# Patient Record
Sex: FEMALE | Race: OTHER
[De-identification: names, ages, dates, MRNs, and addresses within clinical notes are randomized per-mention and may not be internally consistent; named-entity substitution may affect disease eponyms.]

---

## 2017-12-20 PROBLEM — Z00.00 ENCOUNTER FOR PREVENTIVE HEALTH EXAMINATION: Status: ACTIVE | Noted: 2017-12-20

## 2017-12-21 ENCOUNTER — APPOINTMENT (OUTPATIENT)
Dept: CT IMAGING | Facility: CLINIC | Age: 47
End: 2017-12-21
Payer: COMMERCIAL

## 2017-12-21 ENCOUNTER — OUTPATIENT (OUTPATIENT)
Dept: OUTPATIENT SERVICES | Facility: HOSPITAL | Age: 47
LOS: 1 days | End: 2017-12-21
Payer: COMMERCIAL

## 2017-12-21 DIAGNOSIS — Z00.8 ENCOUNTER FOR OTHER GENERAL EXAMINATION: ICD-10-CM

## 2017-12-21 PROCEDURE — 74178 CT ABD&PLV WO CNTR FLWD CNTR: CPT | Mod: 26

## 2017-12-21 PROCEDURE — 74178 CT ABD&PLV WO CNTR FLWD CNTR: CPT

## 2018-01-02 ENCOUNTER — OUTPATIENT (OUTPATIENT)
Dept: OUTPATIENT SERVICES | Facility: HOSPITAL | Age: 48
LOS: 1 days | End: 2018-01-02
Payer: COMMERCIAL

## 2018-01-02 VITALS
WEIGHT: 156.53 LBS | HEIGHT: 58 IN | SYSTOLIC BLOOD PRESSURE: 134 MMHG | RESPIRATION RATE: 16 BRPM | TEMPERATURE: 97 F | HEART RATE: 84 BPM | DIASTOLIC BLOOD PRESSURE: 87 MMHG

## 2018-01-02 DIAGNOSIS — Z98.51 TUBAL LIGATION STATUS: Chronic | ICD-10-CM

## 2018-01-02 DIAGNOSIS — N93.8 OTHER SPECIFIED ABNORMAL UTERINE AND VAGINAL BLEEDING: ICD-10-CM

## 2018-01-02 DIAGNOSIS — Z01.818 ENCOUNTER FOR OTHER PREPROCEDURAL EXAMINATION: ICD-10-CM

## 2018-01-02 DIAGNOSIS — Z86.718 PERSONAL HISTORY OF OTHER VENOUS THROMBOSIS AND EMBOLISM: Chronic | ICD-10-CM

## 2018-01-02 DIAGNOSIS — Z98.891 HISTORY OF UTERINE SCAR FROM PREVIOUS SURGERY: Chronic | ICD-10-CM

## 2018-01-02 LAB
ANION GAP SERPL CALC-SCNC: 14 MMOL/L — SIGNIFICANT CHANGE UP (ref 5–17)
ANISOCYTOSIS BLD QL: SLIGHT — SIGNIFICANT CHANGE UP
APTT BLD: 32 SEC — SIGNIFICANT CHANGE UP (ref 27.5–37.4)
BASOPHILS # BLD AUTO: 0 K/UL — SIGNIFICANT CHANGE UP (ref 0–0.2)
BASOPHILS NFR BLD AUTO: 0.3 % — SIGNIFICANT CHANGE UP (ref 0–2)
BLD GP AB SCN SERPL QL: SIGNIFICANT CHANGE UP
BUN SERPL-MCNC: 11 MG/DL — SIGNIFICANT CHANGE UP (ref 8–20)
CALCIUM SERPL-MCNC: 9.4 MG/DL — SIGNIFICANT CHANGE UP (ref 8.6–10.2)
CHLORIDE SERPL-SCNC: 103 MMOL/L — SIGNIFICANT CHANGE UP (ref 98–107)
CO2 SERPL-SCNC: 22 MMOL/L — SIGNIFICANT CHANGE UP (ref 22–29)
CREAT SERPL-MCNC: 0.52 MG/DL — SIGNIFICANT CHANGE UP (ref 0.5–1.3)
EOSINOPHIL # BLD AUTO: 0.2 K/UL — SIGNIFICANT CHANGE UP (ref 0–0.5)
EOSINOPHIL NFR BLD AUTO: 2.3 % — SIGNIFICANT CHANGE UP (ref 0–6)
GIANT PLATELETS BLD QL SMEAR: PRESENT — SIGNIFICANT CHANGE UP
GLUCOSE SERPL-MCNC: 106 MG/DL — SIGNIFICANT CHANGE UP (ref 70–115)
HCG SERPL-ACNC: <5 MIU/ML — SIGNIFICANT CHANGE UP
HCT VFR BLD CALC: 43.9 % — SIGNIFICANT CHANGE UP (ref 37–47)
HGB BLD-MCNC: 14.3 G/DL — SIGNIFICANT CHANGE UP (ref 12–16)
HYPERCHROMIA BLD QL AUTO: SLIGHT — SIGNIFICANT CHANGE UP
INR BLD: 1.04 RATIO — SIGNIFICANT CHANGE UP (ref 0.88–1.16)
LG PLATELETS BLD QL AUTO: SIGNIFICANT CHANGE UP
LYMPHOCYTES # BLD AUTO: 2.6 K/UL — SIGNIFICANT CHANGE UP (ref 1–4.8)
LYMPHOCYTES # BLD AUTO: 29.7 % — SIGNIFICANT CHANGE UP (ref 20–55)
MCHC RBC-ENTMCNC: 26.7 PG — LOW (ref 27–31)
MCHC RBC-ENTMCNC: 32.6 G/DL — SIGNIFICANT CHANGE UP (ref 32–36)
MCV RBC AUTO: 81.9 FL — SIGNIFICANT CHANGE UP (ref 81–99)
MICROCYTES BLD QL: SLIGHT — SIGNIFICANT CHANGE UP
MONOCYTES # BLD AUTO: 0.8 K/UL — SIGNIFICANT CHANGE UP (ref 0–0.8)
MONOCYTES NFR BLD AUTO: 9 % — SIGNIFICANT CHANGE UP (ref 3–10)
NEUTROPHILS # BLD AUTO: 5 K/UL — SIGNIFICANT CHANGE UP (ref 1.8–8)
NEUTROPHILS NFR BLD AUTO: 58.2 % — SIGNIFICANT CHANGE UP (ref 37–73)
PLAT MORPH BLD: ABNORMAL
PLATELET # BLD AUTO: 258 K/UL — SIGNIFICANT CHANGE UP (ref 150–400)
POIKILOCYTOSIS BLD QL AUTO: SLIGHT — SIGNIFICANT CHANGE UP
POTASSIUM SERPL-MCNC: 3.8 MMOL/L — SIGNIFICANT CHANGE UP (ref 3.5–5.3)
POTASSIUM SERPL-SCNC: 3.8 MMOL/L — SIGNIFICANT CHANGE UP (ref 3.5–5.3)
PROTHROM AB SERPL-ACNC: 11.5 SEC — SIGNIFICANT CHANGE UP (ref 9.8–12.7)
RBC # BLD: 5.36 M/UL — HIGH (ref 4.4–5.2)
RBC # FLD: 22.4 % — HIGH (ref 11–15.6)
RBC BLD AUTO: ABNORMAL
SODIUM SERPL-SCNC: 139 MMOL/L — SIGNIFICANT CHANGE UP (ref 135–145)
TYPE + AB SCN PNL BLD: SIGNIFICANT CHANGE UP
WBC # BLD: 8.6 K/UL — SIGNIFICANT CHANGE UP (ref 4.8–10.8)
WBC # FLD AUTO: 8.6 K/UL — SIGNIFICANT CHANGE UP (ref 4.8–10.8)

## 2018-01-02 PROCEDURE — 36415 COLL VENOUS BLD VENIPUNCTURE: CPT

## 2018-01-02 PROCEDURE — 86850 RBC ANTIBODY SCREEN: CPT

## 2018-01-02 PROCEDURE — 86901 BLOOD TYPING SEROLOGIC RH(D): CPT

## 2018-01-02 PROCEDURE — 85610 PROTHROMBIN TIME: CPT

## 2018-01-02 PROCEDURE — 85730 THROMBOPLASTIN TIME PARTIAL: CPT

## 2018-01-02 PROCEDURE — 85027 COMPLETE CBC AUTOMATED: CPT

## 2018-01-02 PROCEDURE — 80048 BASIC METABOLIC PNL TOTAL CA: CPT

## 2018-01-02 PROCEDURE — G0463: CPT

## 2018-01-02 PROCEDURE — 86900 BLOOD TYPING SEROLOGIC ABO: CPT

## 2018-01-02 PROCEDURE — 84702 CHORIONIC GONADOTROPIN TEST: CPT

## 2018-01-02 RX ORDER — SODIUM CHLORIDE 9 MG/ML
3 INJECTION INTRAMUSCULAR; INTRAVENOUS; SUBCUTANEOUS ONCE
Qty: 0 | Refills: 0 | Status: DISCONTINUED | OUTPATIENT
Start: 2018-01-16 | End: 2018-01-16

## 2018-01-02 NOTE — H&P PST ADULT - PMH
Anemia    Cerebral aneurysm    Renal calculi Anemia    Cerebral embolus  As per pt had "blood clot in brain"  Renal calculi

## 2018-01-02 NOTE — H&P PST ADULT - PROBLEM SELECTOR PLAN 1
Robotic Assisted Total Laparoscopic Hysterectomy, Bilateral Salpingectomy, Cystoscopy, Excision of Vaginal Septum  Spoke with Monica at Dr. Polanco's office regarding abnormal Stress ECHO.  They will contact her PMD and determine what clearance is needed. Robotic Assisted Total Laparoscopic Hysterectomy, Bilateral Salpingectomy, Cystoscopy, Excision of Vaginal Septum  Spoke with Monica at Dr. Polanco's office regarding abnormal Stress ECHO.  They will contact her PMD and determine what clearance is needed.  Pt will require Cardiac Clearance.  I made an appointment for her on January 5, 2018 at 10:40 a.m.

## 2018-01-02 NOTE — ASU PATIENT PROFILE, ADULT - PSH
H/O tubal ligation    History of blood clot in brain  Done in  in [place]  and 2016 in [place]  S/P   x 2

## 2018-01-02 NOTE — H&P PST ADULT - HISTORY OF PRESENT ILLNESS
This is a 47 y.o Bulgarian speaking female who presents to PST today.  The pt reports she has been experiencing abnormal vaginal bleeding for the past 3-4 years, but has become worse more recently and is now accompanied by pelvic pain.   She is scheduled for a hysterectomy in the near future.

## 2018-01-02 NOTE — H&P PST ADULT - NSANTHOSAYNRD_GEN_A_CORE
No. CHARLI screening performed.  STOP BANG Legend: 0-2 = LOW Risk; 3-4 = INTERMEDIATE Risk; 5-8 = HIGH Risk

## 2018-01-02 NOTE — H&P PST ADULT - LAST STRESS TEST
November 2017 (states to have further testing, but insurance not covering it)  Pt does not recall what the abnormality was

## 2018-01-02 NOTE — H&P PST ADULT - PSH
H/O tubal ligation    History of blood clot in brain  Done in  in [place]  and 2016 in [place]  S/P   x 2

## 2018-01-02 NOTE — H&P PST ADULT - FAMILY HISTORY
Father  Still living? Yes, Estimated age: 81-90  Family history of diabetes mellitus, Age at diagnosis: Age Unknown

## 2018-01-16 ENCOUNTER — RESULT REVIEW (OUTPATIENT)
Age: 48
End: 2018-01-16

## 2018-01-16 ENCOUNTER — INPATIENT (INPATIENT)
Facility: HOSPITAL | Age: 48
LOS: 0 days | Discharge: HOME | DRG: 743 | End: 2018-01-17
Attending: OBSTETRICS & GYNECOLOGY | Admitting: OBSTETRICS & GYNECOLOGY
Payer: COMMERCIAL

## 2018-01-16 VITALS — WEIGHT: 156.97 LBS | HEIGHT: 58 IN

## 2018-01-16 DIAGNOSIS — Z86.718 PERSONAL HISTORY OF OTHER VENOUS THROMBOSIS AND EMBOLISM: Chronic | ICD-10-CM

## 2018-01-16 DIAGNOSIS — Z98.51 TUBAL LIGATION STATUS: Chronic | ICD-10-CM

## 2018-01-16 DIAGNOSIS — Z98.891 HISTORY OF UTERINE SCAR FROM PREVIOUS SURGERY: Chronic | ICD-10-CM

## 2018-01-16 DIAGNOSIS — N93.8 OTHER SPECIFIED ABNORMAL UTERINE AND VAGINAL BLEEDING: ICD-10-CM

## 2018-01-16 DIAGNOSIS — Z01.818 ENCOUNTER FOR OTHER PREPROCEDURAL EXAMINATION: ICD-10-CM

## 2018-01-16 LAB
GLUCOSE BLDC GLUCOMTR-MCNC: 100 MG/DL — HIGH (ref 70–99)
GLUCOSE BLDC GLUCOMTR-MCNC: 88 MG/DL — SIGNIFICANT CHANGE UP (ref 70–99)
GLUCOSE BLDC GLUCOMTR-MCNC: 99 MG/DL — SIGNIFICANT CHANGE UP (ref 70–99)
HCT VFR BLD CALC: 33.1 % — LOW (ref 37–47)
HGB BLD-MCNC: 11.2 G/DL — LOW (ref 12–16)
MCHC RBC-ENTMCNC: 28 PG — SIGNIFICANT CHANGE UP (ref 27–31)
MCHC RBC-ENTMCNC: 33.8 G/DL — SIGNIFICANT CHANGE UP (ref 32–36)
MCV RBC AUTO: 82.8 FL — SIGNIFICANT CHANGE UP (ref 81–99)
PLATELET # BLD AUTO: 201 K/UL — SIGNIFICANT CHANGE UP (ref 150–400)
RBC # BLD: 4 M/UL — LOW (ref 4.4–5.2)
RBC # FLD: 17.4 % — HIGH (ref 11–15.6)
WBC # BLD: 16.9 K/UL — HIGH (ref 4.8–10.8)
WBC # FLD AUTO: 16.9 K/UL — HIGH (ref 4.8–10.8)

## 2018-01-16 PROCEDURE — 88307 TISSUE EXAM BY PATHOLOGIST: CPT | Mod: 26

## 2018-01-16 PROCEDURE — 88305 TISSUE EXAM BY PATHOLOGIST: CPT | Mod: 26

## 2018-01-16 PROCEDURE — 88302 TISSUE EXAM BY PATHOLOGIST: CPT | Mod: 26

## 2018-01-16 RX ORDER — CEFOTETAN DISODIUM 1 G
1 VIAL (EA) INJECTION ONCE
Qty: 0 | Refills: 0 | Status: COMPLETED | OUTPATIENT
Start: 2018-01-16 | End: 2018-01-16

## 2018-01-16 RX ORDER — ASPIRIN/CALCIUM CARB/MAGNESIUM 324 MG
1 TABLET ORAL
Qty: 0 | Refills: 0 | COMMUNITY

## 2018-01-16 RX ORDER — OXYCODONE AND ACETAMINOPHEN 5; 325 MG/1; MG/1
2 TABLET ORAL EVERY 4 HOURS
Qty: 0 | Refills: 0 | Status: DISCONTINUED | OUTPATIENT
Start: 2018-01-16 | End: 2018-01-17

## 2018-01-16 RX ORDER — ENOXAPARIN SODIUM 100 MG/ML
40 INJECTION SUBCUTANEOUS DAILY
Qty: 0 | Refills: 0 | Status: DISCONTINUED | OUTPATIENT
Start: 2018-01-17 | End: 2018-01-17

## 2018-01-16 RX ORDER — ENOXAPARIN SODIUM 100 MG/ML
40 INJECTION SUBCUTANEOUS ONCE
Qty: 0 | Refills: 0 | Status: COMPLETED | OUTPATIENT
Start: 2018-01-16 | End: 2018-01-16

## 2018-01-16 RX ORDER — ACETAMINOPHEN 500 MG
1000 TABLET ORAL ONCE
Qty: 0 | Refills: 0 | Status: COMPLETED | OUTPATIENT
Start: 2018-01-16 | End: 2018-01-16

## 2018-01-16 RX ORDER — KETOROLAC TROMETHAMINE 30 MG/ML
30 SYRINGE (ML) INJECTION EVERY 6 HOURS
Qty: 0 | Refills: 0 | Status: DISCONTINUED | OUTPATIENT
Start: 2018-01-16 | End: 2018-01-17

## 2018-01-16 RX ORDER — SODIUM CHLORIDE 9 MG/ML
1000 INJECTION, SOLUTION INTRAVENOUS
Qty: 0 | Refills: 0 | Status: DISCONTINUED | OUTPATIENT
Start: 2018-01-16 | End: 2018-01-16

## 2018-01-16 RX ORDER — ONDANSETRON 8 MG/1
4 TABLET, FILM COATED ORAL EVERY 6 HOURS
Qty: 0 | Refills: 0 | Status: DISCONTINUED | OUTPATIENT
Start: 2018-01-16 | End: 2018-01-17

## 2018-01-16 RX ORDER — DEXTROSE MONOHYDRATE, SODIUM CHLORIDE, AND POTASSIUM CHLORIDE 50; .745; 4.5 G/1000ML; G/1000ML; G/1000ML
1000 INJECTION, SOLUTION INTRAVENOUS
Qty: 0 | Refills: 0 | Status: DISCONTINUED | OUTPATIENT
Start: 2018-01-16 | End: 2018-01-17

## 2018-01-16 RX ORDER — HYDROMORPHONE HYDROCHLORIDE 2 MG/ML
0.5 INJECTION INTRAMUSCULAR; INTRAVENOUS; SUBCUTANEOUS
Qty: 0 | Refills: 0 | Status: DISCONTINUED | OUTPATIENT
Start: 2018-01-16 | End: 2018-01-16

## 2018-01-16 RX ORDER — ONDANSETRON 8 MG/1
4 TABLET, FILM COATED ORAL ONCE
Qty: 0 | Refills: 0 | Status: DISCONTINUED | OUTPATIENT
Start: 2018-01-16 | End: 2018-01-16

## 2018-01-16 RX ORDER — OXYCODONE AND ACETAMINOPHEN 5; 325 MG/1; MG/1
1 TABLET ORAL EVERY 4 HOURS
Qty: 0 | Refills: 0 | Status: DISCONTINUED | OUTPATIENT
Start: 2018-01-16 | End: 2018-01-17

## 2018-01-16 RX ORDER — CEFOTETAN DISODIUM 1 G
2 VIAL (EA) INJECTION ONCE
Qty: 0 | Refills: 0 | Status: COMPLETED | OUTPATIENT
Start: 2018-01-16 | End: 2018-01-16

## 2018-01-16 RX ADMIN — Medication 204 MILLIGRAM(S): at 21:02

## 2018-01-16 RX ADMIN — Medication 100 GRAM(S): at 12:52

## 2018-01-16 RX ADMIN — Medication 1000 MILLIGRAM(S): at 21:15

## 2018-01-16 RX ADMIN — HYDROMORPHONE HYDROCHLORIDE 0.5 MILLIGRAM(S): 2 INJECTION INTRAMUSCULAR; INTRAVENOUS; SUBCUTANEOUS at 16:37

## 2018-01-16 RX ADMIN — HYDROMORPHONE HYDROCHLORIDE 0.5 MILLIGRAM(S): 2 INJECTION INTRAMUSCULAR; INTRAVENOUS; SUBCUTANEOUS at 14:30

## 2018-01-16 RX ADMIN — Medication 100 GRAM(S): at 19:03

## 2018-01-16 RX ADMIN — ENOXAPARIN SODIUM 40 MILLIGRAM(S): 100 INJECTION SUBCUTANEOUS at 19:03

## 2018-01-16 RX ADMIN — ONDANSETRON 4 MILLIGRAM(S): 8 TABLET, FILM COATED ORAL at 19:04

## 2018-01-16 RX ADMIN — HYDROMORPHONE HYDROCHLORIDE 0.5 MILLIGRAM(S): 2 INJECTION INTRAMUSCULAR; INTRAVENOUS; SUBCUTANEOUS at 14:15

## 2018-01-16 RX ADMIN — HYDROMORPHONE HYDROCHLORIDE 0.5 MILLIGRAM(S): 2 INJECTION INTRAMUSCULAR; INTRAVENOUS; SUBCUTANEOUS at 12:45

## 2018-01-16 RX ADMIN — HYDROMORPHONE HYDROCHLORIDE 0.5 MILLIGRAM(S): 2 INJECTION INTRAMUSCULAR; INTRAVENOUS; SUBCUTANEOUS at 12:30

## 2018-01-16 RX ADMIN — Medication 400 MILLIGRAM(S): at 20:40

## 2018-01-16 RX ADMIN — Medication 30 MILLIGRAM(S): at 16:37

## 2018-01-16 RX ADMIN — DEXTROSE MONOHYDRATE, SODIUM CHLORIDE, AND POTASSIUM CHLORIDE 125 MILLILITER(S): 50; .745; 4.5 INJECTION, SOLUTION INTRAVENOUS at 20:15

## 2018-01-16 RX ADMIN — HYDROMORPHONE HYDROCHLORIDE 0.5 MILLIGRAM(S): 2 INJECTION INTRAMUSCULAR; INTRAVENOUS; SUBCUTANEOUS at 15:31

## 2018-01-16 NOTE — ASU DISCHARGE PLAN (ADULT/PEDIATRIC). - ACTIVITY LEVEL
no heavy lifting/weight bearing as tolerated/nothing per vagina/do not drive for 1 week or while taking pain medication

## 2018-01-16 NOTE — PROGRESS NOTE ADULT - PROBLEM SELECTOR PLAN 1
DC to home pending void >150cc's and lovenox injection  Dc instructions given at bedside with , all questions answered  Percocet and naproxen scripts for pain control  Follow-up in one week  Plan per Dr. Polanco

## 2018-01-16 NOTE — CHART NOTE - NSCHARTNOTEFT_GEN_A_CORE
Pt is s/p  RA [facility] BS, cystoscopy, excision of vaginal septum POD 0  I was called by the nurse and notified that the patient had an episode of vomiting and was feeling nauseous. Zofran was given 1 hr prior to the phone call. Pt was seen and examined at bedside. She says that her pain is well controlled, however feels very nauseous at this time. She says she has gotten anesthesia before and has felt this way with prior anesthesia. Physical exam was benign. I ordered ofirmev and 25mg phenergan, before the phenergan was given, she had another episode of vomiting. Dr. Polanco was made aware, he agreed with plan and instructed to get a stat CBC and 1 mg ativan. I instructed the nurse to hold ativan for now, as the patient had just received the phenergan and I wanted to space out if possible. As per Dr. Polanco, no need to make NPO, however I will reaccess and monitor.  Will f/u with stat CBC .

## 2018-01-16 NOTE — ASU DISCHARGE PLAN (ADULT/PEDIATRIC). - MEDICATION SUMMARY - MEDICATIONS TO TAKE
I will START or STAY ON the medications listed below when I get home from the hospital:    oxyCODONE-acetaminophen 5 mg-325 mg oral tablet  -- 1 tab(s) by mouth every 6 hours, As Needed -for severe pain MDD:4 tabs  -- Caution federal law prohibits the transfer of this drug to any person other  than the person for whom it was prescribed.  May cause drowsiness.  Alcohol may intensify this effect.  Use care when operating dangerous machinery.  This prescription cannot be refilled.  This product contains acetaminophen.  Do not use  with any other product containing acetaminophen to prevent possible liver damage.  Using more of this medication than prescribed may cause serious breathing problems.    -- Indication: For pain     Naprosyn 500 mg oral tablet  -- 1 tab(s) by mouth every 12 hours x 3 days   -- Check with your doctor before becoming pregnant.  May cause drowsiness or dizziness.  Obtain medical advice before taking any non-prescription drugs as some may affect the action of this medication.  Take with food or milk.    -- Indication: For pain     aspirin 81 mg oral tablet  -- 1 tab(s) by mouth once a day  -- Indication: For home medication

## 2018-01-16 NOTE — ASU DISCHARGE PLAN (ADULT/PEDIATRIC). - FOLLOWUP APPOINTMENT CLINIC/PHYSICIAN
Please make an appointment to see the PA in Dr. Polanco's office in 1 week.   Please make an appointment to see Dr. Polanco in his office in 2 weeks.

## 2018-01-16 NOTE — PROGRESS NOTE ADULT - SUBJECTIVE AND OBJECTIVE BOX
GYNECOLOGIC ONCOLOGY PROGRESS NOTE    POD#0    PROBLEMS:DUB    Pt seen and examined at bedside.     SUBJECTIVE:    Patient is without complaints.  Pain well-controlled.  Denies Nausea, Vomiting or Diarrhea.  Denies shortness of breath, chest pain or dyspnea on exertion.  Tolerating diet.    OBJECTIVE:     VITALS:  T(F): 98.1 (01-16-18 @ 11:40), Max: 98.1 (01-16-18 @ 11:40)  HR: 79 (01-16-18 @ 13:30) (60 - 79)  BP: 113/70 (01-16-18 @ 13:30) (105/59 - 123/69)  RR: 14 (01-16-18 @ 13:30) (14 - 21)  SpO2: 98% (01-16-18 @ 13:30) (98% - 100%)      I&O's Summary  s/p removal of vera intra-op, will follow-up void.    MEDICATIONS  (STANDING):  lactated ringers. 1000 milliLiter(s) (100 mL/Hr) IV Continuous <Continuous>    MEDICATIONS  (PRN):  HYDROmorphone  Injectable 0.5 milliGRAM(s) IV Push every 10 minutes PRN Moderate Pain (4 - 6)  ondansetron Injectable 4 milliGRAM(s) IV Push once PRN Nausea and/or Vomiting      Physical Exam:  Constitutional: NAD  Pulmonary: clear to auscultation bilaterally   Cardiovascular: Regular rate and rhythm   Abdomen: soft, non-tender, non-distended, normal bowel sounds  Extremities: no lower extremity edema or calve tenderness, Lai's sign negative.  Incision: Clean, dry, op-sites intact.  Without signs of infection or hernia.

## 2018-01-17 ENCOUNTER — TRANSCRIPTION ENCOUNTER (OUTPATIENT)
Age: 48
End: 2018-01-17

## 2018-01-17 VITALS
DIASTOLIC BLOOD PRESSURE: 67 MMHG | SYSTOLIC BLOOD PRESSURE: 108 MMHG | HEART RATE: 71 BPM | RESPIRATION RATE: 16 BRPM | OXYGEN SATURATION: 98 % | TEMPERATURE: 98 F

## 2018-01-17 LAB
ANION GAP SERPL CALC-SCNC: 12 MMOL/L — SIGNIFICANT CHANGE UP (ref 5–17)
BUN SERPL-MCNC: 5 MG/DL — LOW (ref 8–20)
CALCIUM SERPL-MCNC: 8.7 MG/DL — SIGNIFICANT CHANGE UP (ref 8.6–10.2)
CHLORIDE SERPL-SCNC: 103 MMOL/L — SIGNIFICANT CHANGE UP (ref 98–107)
CO2 SERPL-SCNC: 22 MMOL/L — SIGNIFICANT CHANGE UP (ref 22–29)
CREAT SERPL-MCNC: 0.45 MG/DL — LOW (ref 0.5–1.3)
EOSINOPHIL # BLD AUTO: 0 K/UL — SIGNIFICANT CHANGE UP (ref 0–0.5)
EOSINOPHIL NFR BLD AUTO: 0.1 % — SIGNIFICANT CHANGE UP (ref 0–6)
GLUCOSE SERPL-MCNC: 125 MG/DL — HIGH (ref 70–115)
HCT VFR BLD CALC: 31.9 % — LOW (ref 37–47)
HGB BLD-MCNC: 10.4 G/DL — LOW (ref 12–16)
LYMPHOCYTES # BLD AUTO: 1.5 K/UL — SIGNIFICANT CHANGE UP (ref 1–4.8)
LYMPHOCYTES # BLD AUTO: 11.6 % — LOW (ref 20–55)
MAGNESIUM SERPL-MCNC: 2.2 MG/DL — SIGNIFICANT CHANGE UP (ref 1.6–2.6)
MCHC RBC-ENTMCNC: 27.3 PG — SIGNIFICANT CHANGE UP (ref 27–31)
MCHC RBC-ENTMCNC: 32.6 G/DL — SIGNIFICANT CHANGE UP (ref 32–36)
MCV RBC AUTO: 83.7 FL — SIGNIFICANT CHANGE UP (ref 81–99)
MONOCYTES # BLD AUTO: 1.2 K/UL — HIGH (ref 0–0.8)
MONOCYTES NFR BLD AUTO: 9.1 % — SIGNIFICANT CHANGE UP (ref 3–10)
NEUTROPHILS # BLD AUTO: 10.2 K/UL — HIGH (ref 1.8–8)
NEUTROPHILS NFR BLD AUTO: 78.9 % — HIGH (ref 37–73)
PHOSPHATE SERPL-MCNC: 2.7 MG/DL — SIGNIFICANT CHANGE UP (ref 2.4–4.7)
PLATELET # BLD AUTO: 248 K/UL — SIGNIFICANT CHANGE UP (ref 150–400)
POTASSIUM SERPL-MCNC: 3.8 MMOL/L — SIGNIFICANT CHANGE UP (ref 3.5–5.3)
POTASSIUM SERPL-SCNC: 3.8 MMOL/L — SIGNIFICANT CHANGE UP (ref 3.5–5.3)
RBC # BLD: 3.81 M/UL — LOW (ref 4.4–5.2)
RBC # FLD: 17.8 % — HIGH (ref 11–15.6)
SODIUM SERPL-SCNC: 137 MMOL/L — SIGNIFICANT CHANGE UP (ref 135–145)
WBC # BLD: 13 K/UL — HIGH (ref 4.8–10.8)
WBC # FLD AUTO: 13 K/UL — HIGH (ref 4.8–10.8)

## 2018-01-17 NOTE — PROGRESS NOTE ADULT - SUBJECTIVE AND OBJECTIVE BOX
GYNECOLOGIC ONCOLOGY PROGRESS NOTE    POD#1    PROBLEMS:  Dysfunctional uterine bleeding      Pt seen and examined at bedside. Patient with 2 episodes of vomiting overnight, has since resolved. Patient admits to mild nausea.     SUBJECTIVE:    Pain well-controlled.  Flatus: No  Denies Vomiting or Diarrhea.  Denies shortness of breath, chest pain or dyspnea on exertion.  Tolerating liquids, will eat breakfast.    OBJECTIVE:     VITALS:  T(F): 98.9 (01-17-18 @ 05:17), Max: 98.9 (01-17-18 @ 05:17)  HR: 84 (01-17-18 @ 05:17) (60 - 100)  BP: 98/64 (01-17-18 @ 05:17) (98/64 - 123/69)  RR: 18 (01-17-18 @ 05:17) (14 - 21)  SpO2: 95% (01-17-18 @ 05:17) (93% - 100%)    MEDICATIONS  (STANDING):  dextrose 5% + sodium chloride 0.45% with potassium chloride 20 mEq/L 1000 milliLiter(s) (125 mL/Hr) IV Continuous <Continuous>  enoxaparin Injectable 40 milliGRAM(s) SubCutaneous daily  LORazepam   Injectable 1 milliGRAM(s) IV Push once    MEDICATIONS  (PRN):  ketorolac   Injectable 30 milliGRAM(s) IV Push every 6 hours PRN Moderate Pain  ondansetron Injectable 4 milliGRAM(s) IV Push every 6 hours PRN Postoperative Nausea and/or Vomiting  oxyCODONE    5 mG/acetaminophen 325 mG 1 Tablet(s) Oral every 4 hours PRN Moderate Pain  oxyCODONE    5 mG/acetaminophen 325 mG 2 Tablet(s) Oral every 4 hours PRN Severe Pain      Physical Exam:  Constitutional: NAD  Pulmonary: clear to auscultation bilaterally   Cardiovascular: Regular rate and rhythm   Abdomen: soft, non-tender, non-distended, normal bowel sounds  Extremities: no lower extremity edema or calve tenderness, Lai's sign negative.  Incision: Clean, dry, intact.  Without signs of infection or hernia.      LABS:                        10.4   13.0  )-----------( 248      ( 17 Jan 2018 06:12 )             31.9     01-17    137  |  103  |  5.0<L>  ----------------------------<  125<H>  3.8   |  22.0  |  0.45<L>    Ca    8.7      17 Jan 2018 06:12  Phos  2.7     01-17  Mg     2.2     01-17        Patient seen and examined with Dr. Camejo. GYNECOLOGIC ONCOLOGY PROGRESS NOTE    POD#1    PROBLEMS:  Dysfunctional uterine bleeding      Pt seen and examined at bedside. Patient with 2 episodes of vomiting overnight, has since resolved. Patient admits to mild nausea, though tolerating clears.    SUBJECTIVE:    Pain well-controlled.  Flatus: No  Denies Vomiting or Diarrhea.  Denies shortness of breath, chest pain or dyspnea on exertion.  Tolerating liquids, will eat breakfast.    OBJECTIVE:     VITALS:  T(F): 98.9 (01-17-18 @ 05:17), Max: 98.9 (01-17-18 @ 05:17)  HR: 84 (01-17-18 @ 05:17) (60 - 100)  BP: 98/64 (01-17-18 @ 05:17) (98/64 - 123/69)  RR: 18 (01-17-18 @ 05:17) (14 - 21)  SpO2: 95% (01-17-18 @ 05:17) (93% - 100%)    MEDICATIONS  (STANDING):  dextrose 5% + sodium chloride 0.45% with potassium chloride 20 mEq/L 1000 milliLiter(s) (125 mL/Hr) IV Continuous <Continuous>  enoxaparin Injectable 40 milliGRAM(s) SubCutaneous daily  LORazepam   Injectable 1 milliGRAM(s) IV Push once    MEDICATIONS  (PRN):  ketorolac   Injectable 30 milliGRAM(s) IV Push every 6 hours PRN Moderate Pain  ondansetron Injectable 4 milliGRAM(s) IV Push every 6 hours PRN Postoperative Nausea and/or Vomiting  oxyCODONE    5 mG/acetaminophen 325 mG 1 Tablet(s) Oral every 4 hours PRN Moderate Pain  oxyCODONE    5 mG/acetaminophen 325 mG 2 Tablet(s) Oral every 4 hours PRN Severe Pain      Physical Exam:  Constitutional: NAD  Pulmonary: clear to auscultation bilaterally   Cardiovascular: Regular rate and rhythm   Abdomen: soft, non-tender, non-distended, normal bowel sounds  Extremities: no lower extremity edema or calve tenderness, Lai's sign negative.  Incision: Clean, dry, intact.  Without signs of infection or hernia.      LABS:                        10.4   13.0  )-----------( 248      ( 17 Jan 2018 06:12 )             31.9     01-17    137  |  103  |  5.0<L>  ----------------------------<  125<H>  3.8   |  22.0  |  0.45<L>    Ca    8.7      17 Jan 2018 06:12  Phos  2.7     01-17  Mg     2.2     01-17        Patient seen and examined with Dr. Camejo.

## 2018-01-17 NOTE — DISCHARGE NOTE ADULT - CARE PROVIDER_API CALL
Kofi Polanco), Gynecologic Oncology; Obstetrics and Gynecology  [address] [place] [address]  Phone: [phone]  Fax: [phone]

## 2018-01-17 NOTE — DISCHARGE NOTE ADULT - PLAN OF CARE
Improve Care Please follow-up with PA in one week for post-op visit/removal of sutures.  Follow-up with Dr. Polanco in two weeks to review pathology report.

## 2018-01-17 NOTE — DISCHARGE NOTE ADULT - ADDITIONAL INSTRUCTIONS
Please contact your provider for any pain uncontrolled by medication, excessive bleeding or Fever>100.4  Please take Naprosyn tablet every 12 hours x 3 days, may take percocet as prescribed for breakthrough pain.

## 2018-01-17 NOTE — DISCHARGE NOTE ADULT - INSTRUCTIONS
Continue regular diet.     May walk and climb stairs as often as you would like, no vigorous activity, do not lift anything greater than 10lbs, nothing per vagina x 6 weeks, do not drive while on pain medication.

## 2018-01-17 NOTE — PROGRESS NOTE ADULT - PROBLEM SELECTOR PLAN 1
- continue regular diet  - OOB as tolerated   - DVT prophylaxis with SCDs and Lovenox  - Pain control   - Incentive spirometer   - DC planning after tolerating breakfast

## 2018-01-17 NOTE — DISCHARGE NOTE ADULT - PATIENT PORTAL LINK FT
“You can access the [facility] Patient Portal, offered by [facility], by registering with the following website: [email or web address]”

## 2018-01-17 NOTE — DISCHARGE NOTE ADULT - HOSPITAL COURSE
Patient post-operatively had an uncomplicated hospital course. Her pain was well controlled. She is tolerating a regular diet. She is ambulating independently. She was able to void after removal of vera. Patient with flatus. Labs and Vitals WNL upon discharge. Patient post-operatively had 3 episodes of vomiting likely related to anesthesia medications, resolved with anti-emetics. Her pain was well controlled. She is tolerating a regular diet. She is ambulating independently. She was able to void after removal of vera. Labs and Vitals WNL upon discharge.

## 2018-01-17 NOTE — DISCHARGE NOTE ADULT - MEDICATION SUMMARY - MEDICATIONS TO TAKE
I will START or STAY ON the medications listed below when I get home from the hospital:    oxyCODONE-acetaminophen 5 mg-325 mg oral tablet  -- 1 tab(s) by mouth every 6 hours, As Needed -for severe pain MDD:4 tabs  -- Caution federal law prohibits the transfer of this drug to any person other  than the person for whom it was prescribed.  May cause drowsiness.  Alcohol may intensify this effect.  Use care when operating dangerous machinery.  This prescription cannot be refilled.  This product contains acetaminophen.  Do not use  with any other product containing acetaminophen to prevent possible liver damage.  Using more of this medication than prescribed may cause serious breathing problems.    -- Indication: For Pain Control    Naprosyn 500 mg oral tablet  -- 1 tab(s) by mouth every 12 hours x 3 days   -- Check with your doctor before becoming pregnant.  May cause drowsiness or dizziness.  Obtain medical advice before taking any non-prescription drugs as some may affect the action of this medication.  Take with food or milk.    -- Indication: For Pain Control    aspirin 81 mg oral tablet  -- 1 tab(s) by mouth once a day  -- Indication: For As per PMD

## 2018-01-17 NOTE — DISCHARGE NOTE ADULT - CARE PLAN
Principal Discharge DX:	Dysfunctional uterine bleeding  Goal:	Improve Care  Assessment and plan of treatment:	Please follow-up with PA in one week for post-op visit/removal of sutures.  Follow-up with Dr. Polanco in two weeks to review pathology report.

## 2018-01-17 NOTE — DISCHARGE NOTE ADULT - REASON FOR ADMISSION
Robot assisted total laparoscopic hysterectomy, bilateral salpingectomy, cystoscopy and excision of vaginal septum

## 2018-01-23 LAB — SURGICAL PATHOLOGY FINAL REPORT - CH: SIGNIFICANT CHANGE UP

## 2018-05-23 PROCEDURE — S2900: CPT

## 2018-05-23 PROCEDURE — 84100 ASSAY OF PHOSPHORUS: CPT

## 2018-05-23 PROCEDURE — 85027 COMPLETE CBC AUTOMATED: CPT

## 2018-05-23 PROCEDURE — 80048 BASIC METABOLIC PNL TOTAL CA: CPT

## 2018-05-23 PROCEDURE — 36415 COLL VENOUS BLD VENIPUNCTURE: CPT

## 2018-05-23 PROCEDURE — 82962 GLUCOSE BLOOD TEST: CPT

## 2018-05-23 PROCEDURE — T1013: CPT

## 2018-05-23 PROCEDURE — 88302 TISSUE EXAM BY PATHOLOGIST: CPT

## 2018-05-23 PROCEDURE — 88307 TISSUE EXAM BY PATHOLOGIST: CPT

## 2018-05-23 PROCEDURE — 83735 ASSAY OF MAGNESIUM: CPT

## 2018-05-23 PROCEDURE — 88305 TISSUE EXAM BY PATHOLOGIST: CPT

## 2019-03-19 NOTE — ASU PREOP CHECKLIST - DENTURES
no What Type Of Note Output Would You Prefer (Optional)?: Bullet Format Has Your Skin Lesion Been Treated?: been treated Is This A New Presentation, Or A Follow-Up?: Skin Lesion Additional History: Pt stated she thinks she was bit around site because there were two holes on each side.  She also could not walk for several days after the bite occurred.

## 2022-11-09 ENCOUNTER — OFFICE (OUTPATIENT)
Dept: URBAN - METROPOLITAN AREA CLINIC 93 | Facility: CLINIC | Age: 52
Setting detail: OPHTHALMOLOGY
End: 2022-11-09

## 2022-11-09 PROCEDURE — MDRCRDRCRD MEDICAL RECORDS: Performed by: OPHTHALMOLOGY

## 2023-07-21 PROBLEM — D64.9 ANEMIA, UNSPECIFIED: Chronic | Status: ACTIVE | Noted: 2018-01-02

## 2023-07-21 PROBLEM — N20.0 CALCULUS OF KIDNEY: Chronic | Status: ACTIVE | Noted: 2018-01-02

## 2023-07-21 PROBLEM — I66.9 OCCLUSION AND STENOSIS OF UNSPECIFIED CEREBRAL ARTERY: Chronic | Status: ACTIVE | Noted: 2018-01-03

## 2023-07-28 ENCOUNTER — APPOINTMENT (OUTPATIENT)
Dept: NEUROSURGERY | Facility: CLINIC | Age: 53
End: 2023-07-28
Payer: MEDICAID

## 2023-07-28 VITALS
DIASTOLIC BLOOD PRESSURE: 87 MMHG | SYSTOLIC BLOOD PRESSURE: 125 MMHG | TEMPERATURE: 98.3 F | HEART RATE: 72 BPM | WEIGHT: 174 LBS | OXYGEN SATURATION: 96 %

## 2023-07-28 DIAGNOSIS — Z78.9 OTHER SPECIFIED HEALTH STATUS: ICD-10-CM

## 2023-07-28 DIAGNOSIS — Z82.49 FAMILY HISTORY OF ISCHEMIC HEART DISEASE AND OTHER DISEASES OF THE CIRCULATORY SYSTEM: ICD-10-CM

## 2023-07-28 DIAGNOSIS — Z86.79 PERSONAL HISTORY OF OTHER DISEASES OF THE CIRCULATORY SYSTEM: ICD-10-CM

## 2023-07-28 PROCEDURE — 99203 OFFICE O/P NEW LOW 30 MIN: CPT

## 2023-07-28 RX ORDER — ASPIRIN 81 MG
81 TABLET, DELAYED RELEASE (ENTERIC COATED) ORAL
Refills: 0 | Status: ACTIVE | COMMUNITY

## 2023-07-31 NOTE — PHYSICAL EXAM
[FreeTextEntry1] : Awake, alert, and oriented x 3. VSS. In no apparent distress. Short and long term memory intact. Speech is clear and appropriate. Affect is normal. Voice is strong. Respirations easy and even. Normal skin color and pigmentation. The sclera and conjunctiva normal. Ears, nose, and neck normal in appearance. EOMI, no nystagmus, facial sensation not intact, V1, V2, V3 distribution on left diminished, face symmetrical, hearing intact bilaterally, tongue and palate midline, head turning and shoulder shrug symmetric and no tongue deviation with protrusion. No pronator drift. No past-pointing, no tremors noted, no dysdiadochokinesia, and finger to nose dysmetria was not present. Romberg negative.  Right hand dominant.  Rises from a seated position in a comfortable fashion.  Gait is well coordinated and stable without the use of an assistive device. Unable to perform tandem walk without loss of balance. Motor strength in the upper extremities 5/5 in the biceps, triceps, and hand . Motor strength in the lower extremities is 5/5 in the iliopsoas, quadriceps, and hamstrings.

## 2023-07-31 NOTE — HISTORY OF PRESENT ILLNESS
[FreeTextEntry1] : headaches  [de-identified] : GALILEO DIAZ is a 52 year old female presents for initial neurosurgical evaluation of cerebral aneurysm.  Past medical history includes HLD, HTN 2003 and 2015 per patient treated for her brain anuerysm. She denies any headaches, n/v or vision changes.  She has not had any seizures.   Denies any numbness/tingling or weakness of extremities.   Ambulating independently.  No balance or gait disturbances.   No unexplained falls. Family history includes daughter Dina and Vanessa with aneurysm Patient is pending MRA and MRI brain per Bessie Gomez.

## 2023-07-31 NOTE — REASON FOR VISIT
[New Patient Visit] : a new patient visit [Referred By: _________] : Patient was referred by LEEROY [FreeTextEntry1] : headaches

## 2023-07-31 NOTE — ASSESSMENT
[FreeTextEntry1] : Ms. Bills presents with above history.  She is neurologically intact.  Plan: [place] records [facility] MRA and MRI brain ordered by [place]  Continue BP medications as ordered to maintained BP <140/90. Continue statin therapy with an LDL goal of < 70 for secondary stroke prevention. Patient has been given an opportunity to ask and have their questions answered to their satisfaction..end   Stroke prevention requires management of risk factors and patient education.  Risk factors include smoking, excess weight, hypertension, dyslipidemia, heavy alcohol use, physical inactivity, obesity and diabetes.   Blood pressure should be < 140/90.  Lipid- lowering agents may reduce risk of stroke.  patient to maintain regular exercise and body weight and control intake of alcohol.

## 2023-11-26 NOTE — H&P PST ADULT - NS ABD PE RECTAL EXAM
Review of Systems:  · CONSTITUTIONAL: no fever and no chills.  · CARDIOVASCULAR: no chest pain and no edema.  · RESPIRATORY: no chest pain, no cough, and no shortness of breath.  · GASTROINTESTINAL: no abdominal pain, no bloating, no constipation, no diarrhea, no nausea and no vomiting.  · MUSCULOSKELETAL: - - -  · Musculoskeletal [+]: NECK PAIN  · SKIN: no abrasions, no jaundice, no lesions, no pruritis, and no rashes.  · NEURO: no loss of consciousness, no gait abnormality, no headache, no sensory deficits, and no weakness.  · PSYCHIATRIC: no known mental health issues.  · ENDOCRINE: no diabetes and no thyroid trouble.  · ROS STATEMENT: all other ROS negative except as per HPI patient refused

## 2024-01-12 ENCOUNTER — APPOINTMENT (OUTPATIENT)
Dept: NEUROSURGERY | Facility: CLINIC | Age: 54
End: 2024-01-12
Payer: MEDICAID

## 2024-01-12 VITALS
TEMPERATURE: 98.6 F | HEART RATE: 80 BPM | SYSTOLIC BLOOD PRESSURE: 145 MMHG | DIASTOLIC BLOOD PRESSURE: 89 MMHG | OXYGEN SATURATION: 96 % | WEIGHT: 174 LBS | HEIGHT: 58 IN | BODY MASS INDEX: 36.53 KG/M2

## 2024-01-12 PROCEDURE — 99214 OFFICE O/P EST MOD 30 MIN: CPT

## 2024-01-16 NOTE — REASON FOR VISIT
[Follow-Up: _____] : a [unfilled] follow-up visit [New Patient Visit] : a new patient visit [Referred By: _________] : Patient was referred by LEEROY [FreeTextEntry1] : headaches

## 2024-01-16 NOTE — ASSESSMENT
[FreeTextEntry1] : Ms. Bills presents with above history and imaging.  She is neurologically intact.  She admits to occasional headaches relieved by Tylenol.  She is pending cardiology workup for management of hypertension.  She has not had any recent angiograms.  I have reviewed her MRA that reveals stable posttreatment appearance of the distal right ICA. No residual/recurrent or new aneurysm. Strong family history of aneurysm- Dina Bills daughter present today previously patient of Dr. Ferrari but wishes to stay in this area and her sister Vanessa treated by Dr. Blood doing well but no interval follow up yet.   Plan: Establish care with Dr. Adame for consideration of angiogram.  Daughters Dina and Vanessa to establish care for monitoring.  Continue BP medications as ordered to maintained BP <140/90.  LDL goal of < 70 for secondary stroke prevention. Cardiology f/u as directed. Patient has been given an opportunity to ask and have their questions answered to their satisfaction. Patient knows to call the office if there are any new or worsening symptoms.

## 2024-01-16 NOTE — HISTORY OF PRESENT ILLNESS
[FreeTextEntry1] : headaches  [de-identified] : GALILEO DIAZ is a 52 year old female presents for initial neurosurgical evaluation of cerebral aneurysm.  Past medical history includes HLD, HTN 2003 and 2015 per patient treated for her brain aneurysm. 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  She denies any headaches, n/v or vision changes.  She has not had any seizures.   Denies any numbness/tingling or weakness of extremities.   Ambulating independently.  No balance or gait disturbances.   No unexplained falls. Family history includes daughter Dina and Vanessa with cerebral aneurysm.  She presents for review of her MRA/MRI ordered by Bessie Gomez.  Takes aspirin 81 mg daily Per patient her hypertension and cholesterol under good control

## 2024-01-16 NOTE — DATA REVIEWED
[de-identified] : DATE OF EXAM: 08/06/2023 R. Phys. Name: Danielle Spencer R. Phys. Address: [address] [place] [address], [place] [address] R. Phys. Phone: [phone] MRI-BRAIN NON CONTRAST  HISTORY: I67.1 Cerebral aneurysm.  COMPARISON: 05/21/2021.  TECHNIQUE: Multisequence multiplanar MRI of the brain was obtained on a 3.0 Darline ultra high field wide bore magnet.  FINDINGS:  Brain parenchyma: There is no evidence of a recent infarct, hemorrhage, mass or mass effect. There are scattered T2/FLAIR hyperintensities in subcortical white matter, stable since the prior examination.  Extraaxial spaces: Unremarkable.  Ventricles and sulci: Normal for age.  Major cerebral vessels: Blood flow is preserved.  Paranasal sinuses: Clear.  Tympanomastoid air cells: Clear.  Orbits: Minimal multifocal mucosal thickening.  IMPRESSION:   No acute findings.  Stable FLAIR hyperintensities in subcortical white matter are nonspecific, most commonly seen in the setting of migraines.  Signed by: Cynthia Mccabe MD Signed Date: 8/6/2023 11:48 AM EDT    SIGNED BY: Cynthia Mccabe M.D., Ext. [phone] 08/06/2023 11:48 AM  IMPRESSION:   No acute findings.  Stable FLAIR hyperintensities in subcortical white matter are nonspecific, most commonly seen in the setting of migraines.  [de-identified] : DATE OF EXAM: 08/06/2023 RChanel Phys. Name: Danielle Spencer RChanel PhysChanel Address: [address] [place] [address], [place], [facility] R. Phys. Phone: [phone] MRA-HEAD NON CONTRAST  HISTORY: I67.1 cerebral aneurysm  COMPARISON: 05/21/2021  TECHNIQUE: 3-D time-of-flight intracranial MR angiography was performed. Multiple MIP images were generated. The study was performed on a 3.0 Darline ultra high field wide bore magnet.  FINDINGS:  Anterior circulation: There is attenuation in the distal right internal carotid artery, presumably related to pipeline embolization stent. No aneurysm or significant stenosis identified.  Posterior circulation: No aneurysm or significant stenosis  IMPRESSION:   Stable posttreatment appearance of the distal right ICA. No residual/recurrent or new aneurysm.

## 2024-01-26 RX ORDER — AMITRIPTYLINE HYDROCHLORIDE 10 MG/1
10 TABLET, FILM COATED ORAL
Qty: 60 | Refills: 0 | Status: ACTIVE | COMMUNITY
Start: 2024-01-26 | End: 1900-01-01

## 2024-02-21 ENCOUNTER — APPOINTMENT (OUTPATIENT)
Dept: NEUROSURGERY | Facility: CLINIC | Age: 54
End: 2024-02-21
Payer: COMMERCIAL

## 2024-02-21 VITALS
HEIGHT: 58 IN | OXYGEN SATURATION: 97 % | HEART RATE: 94 BPM | TEMPERATURE: 98.8 F | BODY MASS INDEX: 36.53 KG/M2 | SYSTOLIC BLOOD PRESSURE: 118 MMHG | DIASTOLIC BLOOD PRESSURE: 76 MMHG | WEIGHT: 174 LBS

## 2024-02-21 DIAGNOSIS — I67.1 CEREBRAL ANEURYSM, NONRUPTURED: ICD-10-CM

## 2024-02-21 DIAGNOSIS — R51.9 HEADACHE, UNSPECIFIED: ICD-10-CM

## 2024-02-21 PROCEDURE — 99214 OFFICE O/P EST MOD 30 MIN: CPT

## 2025-08-01 ENCOUNTER — APPOINTMENT (OUTPATIENT)
Dept: ORTHOPEDIC SURGERY | Facility: CLINIC | Age: 55
End: 2025-08-01
Payer: COMMERCIAL

## 2025-08-01 DIAGNOSIS — M79.641 PAIN IN RIGHT HAND: ICD-10-CM

## 2025-08-01 DIAGNOSIS — M79.642 PAIN IN RIGHT HAND: ICD-10-CM

## 2025-08-01 DIAGNOSIS — G56.00 CARPAL TUNNEL SYNDROME, UNSPECIFIED UPPER LIMB: ICD-10-CM

## 2025-08-01 PROCEDURE — 99203 OFFICE O/P NEW LOW 30 MIN: CPT

## 2025-08-01 PROCEDURE — 73130 X-RAY EXAM OF HAND: CPT | Mod: 50

## 2025-08-03 PROBLEM — G56.00 CARPAL TUNNEL SYNDROME: Status: ACTIVE | Noted: 2025-08-01
